# Patient Record
(demographics unavailable — no encounter records)

---

## 2024-10-10 NOTE — PHYSICAL EXAM
[Normal] : assessment of respiratory effort is normal [de-identified] : b cryptic tonsillar pockets (R>L);

## 2024-10-10 NOTE — PHYSICAL EXAM
[Normal] : assessment of respiratory effort is normal [de-identified] : b cryptic tonsillar pockets (R>L);

## 2024-10-10 NOTE — ASSESSMENT
[FreeTextEntry1] : 1. tonsil stones -ibuprofen and lidocaine viscous sent for pain management -post-op care instructions discussed, worksheet provided -RTC in x3-4 weeks or earlier as needed

## 2024-10-10 NOTE — HISTORY OF PRESENT ILLNESS
[de-identified] : 1 month follow up visit for this 18 y/o M here for b LAIT for tx of chronic tonsil stones.

## 2024-10-10 NOTE — HISTORY OF PRESENT ILLNESS
[de-identified] : 1 month follow up visit for this 18 y/o M here for b LAIT for tx of chronic tonsil stones.